# Patient Record
(demographics unavailable — no encounter records)

---

## 2024-11-26 NOTE — PHYSICAL EXAM
[de-identified] : Constitutional: 57 year old female, alert and oriented, cooperative, in no acute distress.  HEENT  NC/AT.  Appearance: symmetric  Neck/Back Straight without deformity or instability.  Good ROM.  Chest/Respiratory  Respiratory effort: no intercostal retractions or use of accessory muscles. Nonlabored Breathing  Mental Status:  Judgment, insight: intact Orientation: oriented to time, place, and person  Neurological: Sensory and Motor are grossly intact throughout  Right Shoulder  Inspection:     Skin intact, no rashes or lesions     Non-tender to palpation over AC Joint, Deltoid/Rotator Cuff Insertions, Biceps Tendon  Range of Motion: 	Abduction - 110 	Forward flexion - 160                 IR: T12                 ER: 40  Shoulder Testing      Pain with Empty Can/Job Test     Pain with impingement tests      No pain with IR/ER shoulder  Neurologic Exam     Motor intact including 5/5 AIN/PIN/Median/Radial/Ulnar Nerve Distributions     Sensation Intact to Light Touch including Median/Radial/Ulnar nerve distributions  Vascular Exam     Hand is warm and well perfused with 2+ Radial Pulse   No cervical paraspinal muscle tenderness. [de-identified] : XRay:  XRays of the Cervical Spine (2 Views) taken in the office today and discussed with the patient. XRays demonstrate no obvious fracture or dislocation. There is loss of cervical lordosis. (my personal interpretation).  XRay: XRays of the Right Shoulder (3 Views) taken in the office today and reviewed with the patient. XRays demonstrate no obvious fractures or dislocations. There is no significant evidence of osteoarthritis. (my personal interpretation)

## 2024-11-26 NOTE — HISTORY OF PRESENT ILLNESS
[de-identified] : 11/26/2024  Do Haynes presents to the office for evaluation of her right shoulder pain.  Patient has been experiencing right shoulder pain.  She can have crepitus in the right shoulder.  Pain is located over the lateral shoulder.  She can also have right elbow pain and lateral epicondylitis.  No radiation of the pain.  No neck pain.  No numbness or tingling.  No falls.  No fevers or chills.  She does not take pain medications.  5/18/2023 Do Haynes is a 56-year-old female who presents to the office for evaluation of her bilateral hip and knee pain.  Patient has a history of a fall several years ago with a left knee injury, which improved with PT.  She also has some sacral pain.  She states that her left hip and left knee have caused majority of her pain recently.  Pain can be intermittent and can alternate between her joints.  Her left knee and lower back have experienced pain for several years.  She has experienced right knee pain for the last few months.  There are no known exacerbating factors.  She has not tried anti-inflammatories.  She has tried physical therapy for her left knee several years ago.  No numbness or tingling.  No bowel or bladder changes.  No reported saddle anesthesia.  Patient has not seen rheumatology.  History: HTN

## 2024-11-26 NOTE — DISCUSSION/SUMMARY
[de-identified] : Do Haynes is a 57-year-old female who presents to the office for evaluation of her right shoulder pain.  X-rays showed no obvious fractures or dislocations.  Examination showed good right shoulder range of motion. Discussed with the patient the examination and imaging findings. Discussed the potential etiologies of her pain at this time including, but not limited to, should sprain, shoulder strain, and tendinitis. Discussed with the patient the management of patient's shoulder pain and tendinitis at this time, including physical therapy, anti-inflammatories, and injections.  Patient was given referral for physical therapy.  Patient will take over-the-counter anti-inflammatories as needed for pain control.  Patient will follow-up in 2 months for reevaluation and management.  Patient understanding and in agreement the plan.  All questions answered   Plan: -Physical Therapy -Over-the-counter anti-inflammatories as needed for pain control -Follow up in 2 months for reevaluation and management

## 2025-05-05 NOTE — PLAN
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 walking) for 30 min daily  Thickened endometrium, h/o endo polyp, fibroid: Pt denies PMB Today's pelvic sono - thickened EML 14mm, fibroids (one is stable, other is larger from 4 to 4.5cm), normal ovaries Advised pt to schedule SHG now  Thyroid fullness, h/o hashimoto's: Reviewed 9/2024 thyroid sono - stable thyroid nodule, heterogeneous thyroid parenchyma c/w sequela of thyroiditis  Constipation: Recommended colace, and miralax d/w pt r/b/a   RTO for SHG, or PRN     I, Aaron Barajas, acted as a scribe on behalf of Dr. Cheyanne Ribera M.D on 05/05/2025

## 2025-05-05 NOTE — HISTORY OF PRESENT ILLNESS
[FreeTextEntry1] : 05/05/2025. YEYO BLANCO 58 year old female G0 LMP 2023 presents for annual visit. PMH fibroids, uterine polyp s/p polypectomy 2020, osteopenia, thyroid fullness 2/2 Hashimoto's, HPV16+ benign colposcopy, myomectomy, UAE   She denies VB, abn discharge or vaginitis sxs. No urinary complaints. She has normal BM, no bloody stool. She denies abdominal or pelvic pain. She reports constipation due to hard stool, pain similar to vaginal birth.  Pt has h/o of thyroid fullness 2/2 hashimoto's, last thyroid sono was in 9/2024.   GynHx: HPV +, fibroids, uterine polyp PMH osteopenia, Hashimoto's disease, HTN, GERD, anemia, sickle cell traits, arthritis, torn meniscus x2 (managed w/ exercise) SHx hysteroscopy polypectomy 12/2020, myomectomy, UAE, benign colposcopy Med: Lisinopril, vit d, biotin All: NKDA FHx Sister - breast ca. Denies FHx of ovarian, uterine or colon cancer. Psych: PHQ9 = 1 [TextBox_4] : today's pelvic sono - thickened EML 14mm, stable fibroids, normal ovaries 9/2024 thyroid sono - stable thyroid nodule, heterogeneous thyroid parenchyma c/w sequela of thyroiditis 5/2024 colposcopy, ECC - benign [Mammogramdate] : 10/2024 [TextBox_19] : BIRADS2, Elevated TC 32.4 [BreastSonogramDate] : 10/2024 [BoneDensityDate] : today [TextBox_37] : osteopenia, nml frax

## 2025-05-05 NOTE — END OF VISIT
[FreeTextEntry3] : I personally performed the services described in the documentation, reviewed the documentation recorded by the scribe in my presence and it accurately and completely records my words and actions. I personally performed the history and physical examination and made all the decisions.

## 2025-05-05 NOTE — PHYSICAL EXAM
[Chaperoned Physical Exam] : A chaperone was present in the examining room during all aspects of the physical examination. [Appropriately responsive] : appropriately responsive [Alert] : alert [No Acute Distress] : no acute distress [No Lymphadenopathy] : no lymphadenopathy [Regular Rate Rhythm] : regular rate rhythm [No Murmurs] : no murmurs [Clear to Auscultation B/L] : clear to auscultation bilaterally [Soft] : soft [Non-tender] : non-tender [Non-distended] : non-distended [No HSM] : No HSM [No Lesions] : no lesions [No Mass] : no mass [Oriented x3] : oriented x3 [Examination Of The Breasts] : a normal appearance [No Masses] : no breast masses were palpable [Vulvar Atrophy] : vulvar atrophy [Labia Majora] : normal [Labia Minora] : normal [Atrophy] : atrophy [Normal] : normal [Uterine Adnexae] : normal [FreeTextEntry2] :  Aaron Barajas  [FreeTextEntry1] : jennie [FreeTextEntry5] : stable area of pigmentation, previously bx'ed (benign) [FreeTextEntry9] : Guaiac negative. No masses noted.

## 2025-07-23 NOTE — PHYSICAL EXAM
[Appropriately responsive] : appropriately responsive [Alert] : alert [No Acute Distress] : no acute distress [Oriented x3] : oriented x3 [Labia Majora] : normal [Labia Minora] : normal [Atrophy] : atrophy [Normal] : normal [FreeTextEntry6] : large fibroid uterus

## 2025-07-23 NOTE — PLAN
[FreeTextEntry1] : #thickened EML on pelvic sono -attempted SHG, unsuccessful given contracted vaginal canal & difficulty maneuvering catheter through cervix -pt given option to return s/p prep with vaginal cytotec & motrin vs moving forward with D&C w/ endometrial sampling under anesthesia -pt to call to discuss her decision -RTO PRN

## 2025-07-23 NOTE — HISTORY OF PRESENT ILLNESS
[FreeTextEntry1] : Pt is a 59yo G0 PM female presenting today for SHG in setting of thickened EML on recent pelvic sonogram. SHG attempted, unsuccessful given difficulty with placement of catheter through cervix.